# Patient Record
Sex: FEMALE | Race: ASIAN | NOT HISPANIC OR LATINO | URBAN - METROPOLITAN AREA
[De-identification: names, ages, dates, MRNs, and addresses within clinical notes are randomized per-mention and may not be internally consistent; named-entity substitution may affect disease eponyms.]

---

## 2017-12-11 ENCOUNTER — OFFICE VISIT - HEALTHEAST (OUTPATIENT)
Dept: FAMILY MEDICINE | Facility: CLINIC | Age: 27
End: 2017-12-11

## 2017-12-11 DIAGNOSIS — J02.9 PHARYNGITIS, UNSPECIFIED ETIOLOGY: ICD-10-CM

## 2017-12-11 DIAGNOSIS — J02.9 SORE THROAT: ICD-10-CM

## 2017-12-11 ASSESSMENT — MIFFLIN-ST. JEOR: SCORE: 1294.34

## 2017-12-12 ENCOUNTER — COMMUNICATION - HEALTHEAST (OUTPATIENT)
Dept: FAMILY MEDICINE | Facility: CLINIC | Age: 27
End: 2017-12-12

## 2021-05-31 VITALS — HEIGHT: 65 IN | WEIGHT: 127.3 LBS | BODY MASS INDEX: 21.21 KG/M2

## 2021-06-14 NOTE — PROGRESS NOTES
"Assessment:      Acute pharyngitis, likely   Viral pharyngitis.      Plan:     Reviewed negative rapid strep test results with patient   Use of OTC analgesics recommended as well as salt water gargles.  Follow up as needed.  Throat culture pending, will treat if positive     Subjective:       Laurie Matute is a 27 y.o. female, new to [facility], who presents for evaluation of sore throat. Associated symptoms include pain while swallowing, post nasal drip and sore throat. Onset of symptoms was 2 days ago, and have been gradually worsening since that time. She is drinking moderate amounts of fluids. She has not had a recent known close exposure to someone with proven streptococcal pharyngitis, however, works as a .    The following portions of the patient's history were reviewed and updated as appropriate: allergies, current medications, past family history, past medical history, past social history, past surgical history and problem list.    History reviewed. No pertinent past medical history.  Family History   Problem Relation Age of Onset     TOSHA disease Father      Diabetes Neg Hx      Hypertension Neg Hx      Hyperlipidemia Neg Hx      Social History     Social History     Marital status:      Spouse name: N/A     Number of children: N/A     Years of education: N/A     Occupational History     Not on file.     Social History Main Topics     Smoking status: Never Smoker     Smokeless tobacco: Never Used     Alcohol use Yes      Comment: less than 1 drink/week     Drug use: No     Sexual activity: Yes     Other Topics Concern     Not on file     Social History Narrative    Works as a . From [employer].           Review of Systems  A 12 point comprehensive review of systems was negative except as noted.      Objective:      BP 98/62 (Patient Site: Right Arm, Patient Position: Sitting, Cuff Size: Adult Regular)  Pulse 76  Temp 98.6  F (37  C) (Oral)   Resp 20  Ht 5' 4.75\" (1.645 " m)  Wt 127 lb 4.8 oz (57.7 kg)  LMP 11/17/2017 (Exact Date)  Breastfeeding? No  BMI 21.35 kg/m2  General appearance: alert, appears stated age and cooperative  Head: Normocephalic, without obvious abnormality, atraumatic  Eyes: conjunctivae/corneas clear. PERRL, EOM's intact. Fundi benign.  Ears: normal TM's and external ear canals both ears  Nose: Nares normal. Septum midline. Mucosa normal. No drainage or sinus tenderness.  Throat: abnormal findings: tonsillar hypertrophy asymmetric right tonsil mildly enlarged  Neck: mild anterior cervical adenopathy and thyroid not enlarged, symmetric, no tenderness/mass/nodules  Lungs: clear to auscultation bilaterally  Heart: regular rate and rhythm, S1, S2 normal, no murmur, click, rub or gallop  Skin: Skin color, texture, turgor normal. No rashes or lesions    Laboratory  Strep test done. Results:negative.